# Patient Record
Sex: MALE | Race: WHITE | NOT HISPANIC OR LATINO | Employment: FULL TIME | URBAN - METROPOLITAN AREA
[De-identification: names, ages, dates, MRNs, and addresses within clinical notes are randomized per-mention and may not be internally consistent; named-entity substitution may affect disease eponyms.]

---

## 2020-07-15 ENCOUNTER — OFFICE VISIT (OUTPATIENT)
Dept: FAMILY MEDICINE | Facility: CLINIC | Age: 45
End: 2020-07-15

## 2020-07-15 VITALS
HEIGHT: 73 IN | HEART RATE: 92 BPM | WEIGHT: 197.2 LBS | TEMPERATURE: 98.3 F | DIASTOLIC BLOOD PRESSURE: 80 MMHG | SYSTOLIC BLOOD PRESSURE: 122 MMHG | BODY MASS INDEX: 26.14 KG/M2 | OXYGEN SATURATION: 98 %

## 2020-07-15 DIAGNOSIS — Z71.89 ACP (ADVANCE CARE PLANNING): ICD-10-CM

## 2020-07-15 DIAGNOSIS — F41.1 GENERALIZED ANXIETY DISORDER: ICD-10-CM

## 2020-07-15 DIAGNOSIS — Z76.89 HEALTH CARE HOME: ICD-10-CM

## 2020-07-15 DIAGNOSIS — Z00.00 ROUTINE HISTORY AND PHYSICAL EXAMINATION OF ADULT: Primary | ICD-10-CM

## 2020-07-15 DIAGNOSIS — B35.6 TINEA CRURIS: ICD-10-CM

## 2020-07-15 LAB
BUN SERPL-MCNC: 17 MG/DL (ref 7–25)
BUN/CREATININE RATIO: 14.7 (ref 6–22)
CALCIUM SERPL-MCNC: 9.5 MG/DL (ref 8.6–10.3)
CHLORIDE SERPLBLD-SCNC: 103 MMOL/L (ref 98–110)
CHOLEST SERPL-MCNC: 247 MG/DL (ref 0–199)
CHOLEST/HDLC SERPL: 4 {RATIO} (ref 0–5)
CO2 SERPL-SCNC: 29.3 MMOL/L (ref 20–32)
CREAT SERPL-MCNC: 1.16 MG/DL (ref 0.7–1.18)
GLUCOSE SERPL-MCNC: 116 MG/DL (ref 60–99)
HDLC SERPL-MCNC: 70 MG/DL (ref 40–150)
HEMOGLOBIN: 16.6 G/DL (ref 13.3–17.7)
LDLC SERPL CALC-MCNC: 153 MG/DL (ref 0–130)
POTASSIUM SERPL-SCNC: 4.52 MMOL/L (ref 3.5–5.3)
SODIUM SERPL-SCNC: 141.6 MMOL/L (ref 135–146)
TRIGL SERPL-MCNC: 121 MG/DL (ref 0–149)

## 2020-07-15 PROCEDURE — 99396 PREV VISIT EST AGE 40-64: CPT | Performed by: FAMILY MEDICINE

## 2020-07-15 PROCEDURE — 80048 BASIC METABOLIC PNL TOTAL CA: CPT | Performed by: FAMILY MEDICINE

## 2020-07-15 PROCEDURE — 85018 HEMOGLOBIN: CPT | Performed by: FAMILY MEDICINE

## 2020-07-15 PROCEDURE — 80061 LIPID PANEL: CPT | Performed by: FAMILY MEDICINE

## 2020-07-15 RX ORDER — DULOXETIN HYDROCHLORIDE 30 MG/1
30 CAPSULE, DELAYED RELEASE ORAL 2 TIMES DAILY
Qty: 60 CAPSULE | Refills: 1 | Status: SHIPPED | OUTPATIENT
Start: 2020-07-15

## 2020-07-15 RX ORDER — KETOCONAZOLE 20 MG/G
CREAM TOPICAL 2 TIMES DAILY
Qty: 30 G | Refills: 3 | Status: SHIPPED | OUTPATIENT
Start: 2020-07-15

## 2020-07-15 SDOH — HEALTH STABILITY: MENTAL HEALTH: HOW OFTEN DO YOU HAVE A DRINK CONTAINING ALCOHOL?: 2-3 TIMES A WEEK

## 2020-07-15 SDOH — HEALTH STABILITY: MENTAL HEALTH: HOW MANY STANDARD DRINKS CONTAINING ALCOHOL DO YOU HAVE ON A TYPICAL DAY?: 1 OR 2

## 2020-07-15 ASSESSMENT — PATIENT HEALTH QUESTIONNAIRE - PHQ9
5. POOR APPETITE OR OVEREATING: NOT AT ALL
SUM OF ALL RESPONSES TO PHQ QUESTIONS 1-9: 4

## 2020-07-15 ASSESSMENT — ANXIETY QUESTIONNAIRES
GAD7 TOTAL SCORE: 5
2. NOT BEING ABLE TO STOP OR CONTROL WORRYING: SEVERAL DAYS
5. BEING SO RESTLESS THAT IT IS HARD TO SIT STILL: NOT AT ALL
3. WORRYING TOO MUCH ABOUT DIFFERENT THINGS: SEVERAL DAYS
1. FEELING NERVOUS, ANXIOUS, OR ON EDGE: SEVERAL DAYS
IF YOU CHECKED OFF ANY PROBLEMS ON THIS QUESTIONNAIRE, HOW DIFFICULT HAVE THESE PROBLEMS MADE IT FOR YOU TO DO YOUR WORK, TAKE CARE OF THINGS AT HOME, OR GET ALONG WITH OTHER PEOPLE: SOMEWHAT DIFFICULT
7. FEELING AFRAID AS IF SOMETHING AWFUL MIGHT HAPPEN: SEVERAL DAYS
6. BECOMING EASILY ANNOYED OR IRRITABLE: SEVERAL DAYS

## 2020-07-15 ASSESSMENT — MIFFLIN-ST. JEOR: SCORE: 1835.43

## 2020-07-15 NOTE — LETTER
[facility]  [address]  [address]  [facility] [address]  [phone]      July 15, 2020      Jimmy Valdez  [address] [address] [facility] [address]      EMERGENCY CARE PLAN  Presenting Problem Treatment Plan   Questions or concerns during clinic hours I will call the clinic directly:    [facility]  [address] , [address]  [place] [address]  [phone]   Questions or concerns outside clinic hours  I will call the 24 hour line at [phone]   Patient needs to schedule an appointment  I will call the  scheduling line at [phone]   Same day treatment   I will call the clinic first, then  urgent care and/or  express care if needed   Clinic Care Coordinators Shu Morales RN:  [phone]  [facility] Clinical Support Staff: [phone]    Crisis Services:  Behavioral or Mental Health P ([facility])   [phone]   Emergency treatment--Immediately CALL 021

## 2020-07-15 NOTE — NURSING NOTE
Jimmy is here for CPX fasting and to discuss anxiety    Pre-visit Screening:  Immunizations:  up to date  Colonoscopy:  NA  Mammogram: NA  Asthma Action Test/Plan:  NA  PHQ9:  Done today  GAD7:  Done today  Questioned patient about current smoking habits Pt. quit smoking some time ago.  Ok to leave detailed message on voice mail for today's visit only Yes, phone # [phone]

## 2020-07-15 NOTE — PROGRESS NOTES
SUBJECTIVE:    CC: Jimmy Valdez is a 45 year old male who presents for physical    HPI: over all dooing well   Some anxiety    HISTORIES:    PROBLEM LIST:                   Patient Active Problem List   Diagnosis     Inverse psoriasis     Alcohol abuse     Sciatica     Tachycardia     CARDIOVASCULAR SCREENING; LDL GOAL LESS THAN 160     GERD (gastroesophageal reflux disease)     Impaired fasting glucose     Hyperlipidemia LDL goal <160     ACP (advance care planning)     Health Care Home       PAST MEDICAL HISTORY:                  Past Medical History:   Diagnosis Date     Acid reflux      NO ACTIVE PROBLEMS        PAST SURGICAL HISTORY:                  Past Surgical History:   Procedure Laterality Date     FUSION LUMBAR ANTERIOR ONE LEVEL  2010    L4-5     SURGICAL HISTORY OF -       lumbar discectomy       CURRENT MEDICATIONS:                  No current outpatient medications on file.       ALLERGIES:                  Allergies   Allergen Reactions     Seasonal Allergies        SOCIAL HISTORY:                  Social History     Socioeconomic History     Marital status:      Spouse name: Not on file     Number of children: 2     Years of education: Not on file     Highest education level: Not on file   Occupational History     Employer: [employer]   Social Needs     Financial resource strain: Not on file     Food insecurity     Worry: Not on file     Inability: Not on file     Transportation needs     Medical: Not on file     Non-medical: Not on file   Tobacco Use     Smoking status: Former Smoker     Last attempt to quit: 1999     Years since quittin.5     Smokeless tobacco: Never Used   Substance and Sexual Activity     Alcohol use: Yes     Alcohol/week: 5.0 standard drinks     Types: 6 Standard drinks or equivalent per week     Frequency: 2-3 times a week     Drinks per session: 1 or 2     Drug use: No     Sexual activity: Yes     Partners: Female     Birth control/protection:  "Pill   Lifestyle     Physical activity     Days per week: Not on file     Minutes per session: Not on file     Stress: Not on file   Relationships     Social connections     Talks on phone: Not on file     Gets together: Not on file     Attends Anabaptism service: Not on file     Active member of club or organization: Not on file     Attends meetings of clubs or organizations: Not on file     Relationship status: Not on file     Intimate partner violence     Fear of current or ex partner: Not on file     Emotionally abused: Not on file     Physically abused: Not on file     Forced sexual activity: Not on file   Other Topics Concern     Parent/sibling w/ CABG, MI or angioplasty before 65F 55M? No   Social History Narrative     Not on file       FAMILY HISTORY:                   Family History   Problem Relation Age of Onset     Cancer Mother         tongue     Family History Negative Father      C.A.D. Maternal Grandfather         MI @ 72       HEALTH MAINTENANCE:                    REVIEW OF OUTSIDE RECORDS: NO    REVIEW OF SYSTEMS:  CONSTITUTIONAL: NEGATIVE for fever, chills  EYES: NEGATIVE for vision changes   RESP: NEGATIVE for significant cough or SOB  CV: NEGATIVE for chest pain, palpitations   GI: NEGATIVE for nausea, abdominal pain, heartburn, or change in bowel habits  : NEGATIVE for frequency, dysuria, or hematuria  MUSCULOSKELETAL: NEGATIVE for significant arthralgias or myalgia  NEURO: NEGATIVE for weakness, dizziness or paresthesias or headache  PSYCHIATRIC: anxiety    EXAM:    /80 (BP Location: Left arm, Patient Position: Sitting, Cuff Size: Adult Large)   Pulse 92   Temp 98.3  F (36.8  C) (Oral)   Ht 1.842 m (6' 0.5\")   Wt 89.4 kg (197 lb 3.2 oz)   SpO2 98%   BMI 26.38 kg/m    GENERAL APPEARANCE: healthy, alert and no distress   EXAM:  GENERAL APPEARANCE: healthy, alert and no distress  EYES: EOMI,  PERRL  HENT: ear canals and TM's normal and nose and mouth without ulcers or lesions  RESP: " lungs clear to auscultation - no rales, rhonchi or wheezes  CV: regular rates and rhythm, normal S1 S2, no S3 or S4 and no murmur, click or rub -  ABDOMEN:  soft, nontender, no HSM or masses and bowel sounds normal  GU_male: testicles normal without atrophy or masses and no hernias  MS: extremities normal- no gross deformities noted, no evidence of inflammation in joints, FROM in all extremities.  SKIN: tinea rash in groin  LYMPHATICS: No axillary, cervical, inguinal, or supraclavicular nodes         ASSESSMENT/PLAN  (Z00.00) Routine history and physical examination of adult  (primary encounter diagnosis)  Comment: over all heaalthy  Plan:     (Z71.89) ACP (advance care planning)  Comment:   Plan:     (Z76.89) Health Care Home  Comment:   Plan:     (B35.6) Tinea cruris  Comment:   Plan: nizoral    (F41.1) Generalized anxiety disorder  Comment: cymbalta  Plan:             I have discussed with patient the risks, benefits, medications, treatment options and modalities.   I have instructed the patient to call or schedule a follow-up appointment if any problems or failure to improve.

## 2020-07-16 ASSESSMENT — ANXIETY QUESTIONNAIRES: GAD7 TOTAL SCORE: 5

## 2020-11-29 ENCOUNTER — HEALTH MAINTENANCE LETTER (OUTPATIENT)
Age: 45
End: 2020-11-29

## 2021-09-25 ENCOUNTER — HEALTH MAINTENANCE LETTER (OUTPATIENT)
Age: 46
End: 2021-09-25

## 2022-12-26 ENCOUNTER — HEALTH MAINTENANCE LETTER (OUTPATIENT)
Age: 47
End: 2022-12-26

## 2023-04-09 ENCOUNTER — HOSPITAL ENCOUNTER (EMERGENCY)
Facility: CLINIC | Age: 48
Discharge: HOME | End: 2023-04-09
Attending: EMERGENCY MEDICINE | Admitting: EMERGENCY MEDICINE
Payer: COMMERCIAL

## 2023-04-09 ENCOUNTER — APPOINTMENT (OUTPATIENT)
Dept: GENERAL RADIOLOGY | Facility: CLINIC | Age: 48
End: 2023-04-09
Attending: EMERGENCY MEDICINE
Payer: COMMERCIAL

## 2023-04-09 VITALS
TEMPERATURE: 97.9 F | HEART RATE: 84 BPM | DIASTOLIC BLOOD PRESSURE: 95 MMHG | OXYGEN SATURATION: 99 % | RESPIRATION RATE: 18 BRPM | SYSTOLIC BLOOD PRESSURE: 132 MMHG

## 2023-04-09 DIAGNOSIS — S80.02XA CONTUSION OF LEFT KNEE, INITIAL ENCOUNTER: ICD-10-CM

## 2023-04-09 PROCEDURE — 250N000013 HC RX MED GY IP 250 OP 250 PS 637: Performed by: EMERGENCY MEDICINE

## 2023-04-09 PROCEDURE — 73080 X-RAY EXAM OF ELBOW: CPT | Mod: LT

## 2023-04-09 PROCEDURE — 99284 EMERGENCY DEPT VISIT MOD MDM: CPT

## 2023-04-09 PROCEDURE — 73562 X-RAY EXAM OF KNEE 3: CPT | Mod: LT

## 2023-04-09 RX ORDER — ACETAMINOPHEN 500 MG
1000 TABLET ORAL ONCE
Status: COMPLETED | OUTPATIENT
Start: 2023-04-09 | End: 2023-04-09

## 2023-04-09 RX ORDER — IBUPROFEN 800 MG/1
800 TABLET, FILM COATED ORAL ONCE
Status: COMPLETED | OUTPATIENT
Start: 2023-04-09 | End: 2023-04-09

## 2023-04-09 RX ADMIN — IBUPROFEN 800 MG: 800 TABLET ORAL at 22:13

## 2023-04-09 RX ADMIN — ACETAMINOPHEN 1000 MG: 500 TABLET ORAL at 22:13

## 2023-04-09 NOTE — Clinical Note
Jimmy Valdez was seen and treated in our emergency department on 4/9/2023.  He may return to work on 04/12/2023.  Patient will need to elevate the left leg as much as possible and ice regularly.  He will have limited ability to bend, squat, climb secondary to left knee injury     If you have any questions or concerns, please don't hesitate to call.      Victor M Dixon MD

## 2023-04-10 NOTE — ED TRIAGE NOTES
Patient reports falling today when vacuuming and has left knee pain and swelling.  ABCs intact, A&Ox4     Triage Assessment     Row Name 04/09/23 2100       Triage Assessment (Adult)    Airway WDL WDL       Respiratory WDL    Respiratory WDL WDL       Skin Circulation/Temperature WDL    Skin Circulation/Temperature WDL WDL       Cardiac WDL    Cardiac WDL WDL       Peripheral/Neurovascular WDL    Peripheral Neurovascular WDL WDL       Cognitive/Neuro/Behavioral WDL    Cognitive/Neuro/Behavioral WDL WDL

## 2023-04-14 ASSESSMENT — ENCOUNTER SYMPTOMS
WEAKNESS: 0
NUMBNESS: 0
ARTHRALGIAS: 1
FEVER: 0

## 2023-04-14 NOTE — ED PROVIDER NOTES
History     Chief Complaint:  Knee Injury       HPI     Jimmy Valdez is a 47 year old male who presents with left knee pain.  Patient reports that he had a mechanical fall resulting in traumatic left knee pain.  Pain is isolated to the knee.  He denies any additional areas of injury.  Patient has been able to ambulate although with discomfort.  He denies any associated numbness or weakness.  He has no other complaints or concerns.      Independent Historian: None     Review of External Notes: None     ROS:  Review of Systems   Constitutional: Negative for fever.   Musculoskeletal: Positive for arthralgias.   Neurological: Negative for weakness and numbness.   All other systems reviewed and are negative.      Allergies:  Seasonal Allergies     Medications:    DULoxetine (CYMBALTA) 30 MG capsule  ketoconazole (NIZORAL) 2 % external cream        Past Medical History:    Past Medical History:   Diagnosis Date     Acid reflux      NO ACTIVE PROBLEMS        Past Surgical History:    Past Surgical History:   Procedure Laterality Date     FUSION LUMBAR ANTERIOR ONE LEVEL  2/2010    L4-5     SURGICAL HISTORY OF -   2/09    lumbar discectomy        Family History:    family history includes C.A.D. in his maternal grandfather; Cancer in his mother; Family History Negative in his father.      Physical Exam   No data found.     Physical Exam    EYES:   Conjunctiva normal.  NECK:    Supple, no meningismus.   CV:     Regular rate and rhythm     No murmurs, rubs or gallops.       2+ DP pulses bilateral.  PULM:    Clear to auscultation bilateral.       No respiratory distress.      No wheezing, rales or stridor.  MSK:     Left lower extremity : No gross deformity.       No tenderness at the hip or ankle.      Knee:       No knee effusion.  Full passive ROM.       Tenderness at medial and lateral joint space.       Extensor mechanism intact.         Anterior and posterior drawer test normal.       Lachman's test  normal.       No laxity of the MCL or LCL with valgus or varus strain.  LYMPH:   No cervical lymphadenopathy.  NEURO:   Left lower extremity :Strength is 5/5      Sensation intact.  SKIN:    Warm, dry   PSYCH:    Mood is good and affect is appropriate.      Emergency Department Course     Imaging:  XR Knee Left 3 Views   Final Result   IMPRESSION: Normal joint spaces and alignment. No fracture or joint effusion.      Elbow  XR, G/E 3 views, left   Final Result   IMPRESSION: Normal joint spaces and alignment. No fracture or joint effusion.        Report per radiology    Emergency Department Course & Assessments:      Interventions:  Medications   ibuprofen (ADVIL/MOTRIN) tablet 800 mg (800 mg Oral $Given 23)   acetaminophen (TYLENOL) tablet 1,000 mg (1,000 mg Oral $Given 23)        Independent Interpretation (X-rays, CTs, rhythm strip):  I independently reviewed x-ray of the knee in which there is no fracture or dislocation    Social Determinants of Health affecting care:  None    Disposition:  The patient was discharged to home.     Impression & Plan        Medical Decision Makin-year-old male presented to the ED with traumatic left knee pain.  Extensor mechanism and ligaments intact.  X-ray is negative for fracture/dislocation.  Evaluation most consistent with soft tissue contusion and very low suspicion for occult ligamentous or meniscus injury.  Supportive measures indicated.  Follow-up with PCP if symptoms persist in 1 week.    Diagnosis:    ICD-10-CM    1. Contusion of left knee, initial encounter  S80.02XA            Discharge Medications:  Discharge Medication List as of 2023 10:49 PM           Victor M Slaughter MD, MD  23 0743

## 2024-02-04 ENCOUNTER — HEALTH MAINTENANCE LETTER (OUTPATIENT)
Age: 49
End: 2024-02-04

## 2024-06-17 PROBLEM — Z76.89 HEALTH CARE HOME: Status: RESOLVED | Noted: 2020-07-15 | Resolved: 2024-06-17

## 2025-03-02 ENCOUNTER — HEALTH MAINTENANCE LETTER (OUTPATIENT)
Age: 50
End: 2025-03-02